# Patient Record
Sex: MALE | Race: WHITE | Employment: FULL TIME | URBAN - METROPOLITAN AREA
[De-identification: names, ages, dates, MRNs, and addresses within clinical notes are randomized per-mention and may not be internally consistent; named-entity substitution may affect disease eponyms.]

---

## 2024-07-24 ENCOUNTER — OFFICE VISIT (OUTPATIENT)
Age: 29
End: 2024-07-24

## 2024-07-24 VITALS
SYSTOLIC BLOOD PRESSURE: 133 MMHG | DIASTOLIC BLOOD PRESSURE: 85 MMHG | HEIGHT: 72 IN | TEMPERATURE: 98.7 F | OXYGEN SATURATION: 98 % | HEART RATE: 98 BPM | WEIGHT: 200.6 LBS | BODY MASS INDEX: 27.17 KG/M2 | RESPIRATION RATE: 18 BRPM

## 2024-07-24 DIAGNOSIS — M54.6 ACUTE MIDLINE THORACIC BACK PAIN: Primary | ICD-10-CM

## 2024-07-24 DIAGNOSIS — M54.14 THORACIC RADICULOPATHY: ICD-10-CM

## 2024-07-24 RX ORDER — IBUPROFEN 800 MG/1
800 TABLET ORAL 2 TIMES DAILY PRN
Qty: 180 TABLET | Refills: 1 | Status: SHIPPED | OUTPATIENT
Start: 2024-07-24

## 2024-07-24 RX ORDER — LIDOCAINE 4 G/G
1 PATCH TOPICAL DAILY
Qty: 10 PATCH | Refills: 0 | Status: SHIPPED | OUTPATIENT
Start: 2024-07-24 | End: 2024-08-03

## 2024-07-24 ASSESSMENT — ENCOUNTER SYMPTOMS: BACK PAIN: 1

## 2024-07-24 NOTE — PATIENT INSTRUCTIONS
Thank you for visiting [facility] today.    If you develop a fever, gross inability to walk/weakness, severe numbness in bilateral lower extremities, saddle paresthesias, and/or loss of bladder/bowel control, please go to the nearest emergency department for concern of cauda equina.    -Alternate between ice and heat.  Heat may be applied for 30 minutes at a time every 4-5 hours.  Take warm epsom salt baths and gentle stretches.  -Lidocaine patches for 12 hours on and 12 hours off.  -Alternate Ibuprofen and Tylenol  -Increase water hydration.  -Avoid lifting heavy objects.  -Back exercises once pain has been controlled    Please follow up with your primary care provider within 2-3 days if  your signs and symptoms have not resolved or worsened.    Please go immediately to the Emergency Department if you develop loss of bowel or bladder function, peripheral numbness/weakness/tingling, shortness of breath, chest pain, and significant fevers above 100.4F.

## 2024-07-24 NOTE — PROGRESS NOTES
2024   Malvin Ash (: 1995) is a 28 y.o. male, New patient, here for evaluation of the following chief complaint(s):  Back Pain (Persistent back pain, lower and upper back. sx started a year ago, which went away but bout three months the pain began again. \"Sharp , fire\" pain)     ASSESSMENT/PLAN:  Below is the assessment and plan developed based on review of pertinent history, physical exam, labs, studies, and medications.  1. Acute midline thoracic back pain  -     XR THORACIC SPINE (2 VIEWS); Future  2. Thoracic radiculopathy  -     [facility] - Lea Young MD, Neurology, [place]       The patient presents with lower back pain without evidence for a more malignant injury. There is no suggestion of cauda equina and no neurovascular symptoms.  No suggestion of malignancy, mass lesion, or aortic dissection.  There has been no history of immunocompromise, recent spinal injection or IVDU to suggest spinal epidural abscess.  The patient did not have urinary incontinence, bowel incontinence, saddle anesthesia, fever, or weight loss.  Given the patient's presentation and physical exam findings, I did not feel that imaging was warranted.  The patient has significant muscular tenderness to palpation in the thoracic paraspinous musculature.  The patient's exam was not consistent with pyelonephritis and the patient is afebrile, making other infectious etiologies less likely.    The patient is a good candidate for outpatient symptomatic management.  I instructed the patient that back pain of this nature will take time to improve, but it often does.  Patient was instructed to go to the emergency department with any worsening symptoms including, but not limited to, numbness or weakness in the legs, bowel or bladder problems, numbness in the groin, and/or significant fevers.  Patient was also instructed to go to the ER with any worsening symptoms or questions.    Discharge decision based on the following:  clinical

## 2024-08-01 ENCOUNTER — HOSPITAL ENCOUNTER (EMERGENCY)
Facility: HOSPITAL | Age: 29
Discharge: HOME | End: 2024-08-01
Attending: EMERGENCY MEDICINE
Payer: COMMERCIAL

## 2024-08-01 VITALS
SYSTOLIC BLOOD PRESSURE: 147 MMHG | HEIGHT: 72 IN | TEMPERATURE: 98.2 F | DIASTOLIC BLOOD PRESSURE: 82 MMHG | HEART RATE: 94 BPM | WEIGHT: 200 LBS | RESPIRATION RATE: 16 BRPM | BODY MASS INDEX: 27.09 KG/M2 | OXYGEN SATURATION: 98 %

## 2024-08-01 DIAGNOSIS — M54.12 CERVICAL RADICULOPATHY: Primary | ICD-10-CM

## 2024-08-01 DIAGNOSIS — M54.16 LUMBAR RADICULOPATHY: ICD-10-CM

## 2024-08-01 PROCEDURE — 99282 EMERGENCY DEPT VISIT SF MDM: CPT

## 2024-08-01 ASSESSMENT — PAIN SCALES - GENERAL: PAINLEVEL_OUTOF10: 3

## 2024-08-01 ASSESSMENT — PAIN DESCRIPTION - DESCRIPTORS: DESCRIPTORS: BURNING;SHARP

## 2024-08-01 ASSESSMENT — PAIN DESCRIPTION - ORIENTATION: ORIENTATION: MID;LOWER

## 2024-08-01 ASSESSMENT — PAIN DESCRIPTION - LOCATION: LOCATION: BACK

## 2024-08-01 NOTE — ED PROVIDER NOTES
[facility] EMERGENCY DEPT  EMERGENCY DEPARTMENT ENCOUNTER      Pt Name: Malvin Ash  MRN: [id number]  Birthdate 1995  Date of evaluation: 8/1/2024  Provider: Elieser Valdez MD    CHIEF COMPLAINT       Chief Complaint   Patient presents with    Back Pain         HISTORY OF PRESENT ILLNESS   (Location/Symptom, Timing/Onset, Context/Setting, Quality, Duration, Modifying Factors, Severity)  Note limiting factors.   28-year-old with a history of back pain.  He presents with a 3-month history of upper and lower back pain.  It has been intermittent.  He states that the upper pain is between his shoulder blades.  Lower pain is in the lumbar region.  He states that he has had left hand and foot numbness and tingling.  Both legs felt a bit weak today.  No bowel or bladder incontinence.  He has been formally seen in an urgent care center and prescribed ibuprofen and Lidoderm patches.  They have helped somewhat.  He was referred to neurology but has not made an appointment yet.          Review of External Medical Records:     Nursing Notes were reviewed.    REVIEW OF SYSTEMS    (2-9 systems for level 4, 10 or more for level 5)     Review of Systems    Except as noted above the remainder of the review of systems was reviewed and negative.       PAST MEDICAL HISTORY   History reviewed. No pertinent past medical history.      SURGICAL HISTORY     History reviewed. No pertinent surgical history.      CURRENT MEDICATIONS       Previous Medications    IBUPROFEN (ADVIL;MOTRIN) 800 MG TABLET    Take 1 tablet by mouth 2 times daily as needed for Pain    LIDOCAINE 4 % EXTERNAL PATCH    Place 1 patch onto the skin daily for 10 days       ALLERGIES     Demerol hcl [meperidine]    FAMILY HISTORY     History reviewed. No pertinent family history.       SOCIAL HISTORY       Social History     Socioeconomic History    Marital status: Unknown     Spouse name: None    Number of children: None    Years of education: None    Highest education

## 2024-08-01 NOTE — ED TRIAGE NOTES
Patient in to ED with c/o upper and lower back pain for 3 months. Unknown injury. Burning/sharp pain. Also c/o left hand and foot tingling at times. Took 600mg ibuprofen today with some relief.